# Patient Record
Sex: FEMALE | Race: WHITE | URBAN - NONMETROPOLITAN AREA
[De-identification: names, ages, dates, MRNs, and addresses within clinical notes are randomized per-mention and may not be internally consistent; named-entity substitution may affect disease eponyms.]

---

## 2019-02-13 ENCOUNTER — IMPORTED ENCOUNTER (OUTPATIENT)
Dept: URBAN - NONMETROPOLITAN AREA CLINIC 1 | Facility: CLINIC | Age: 79
End: 2019-02-13

## 2019-02-13 PROBLEM — H25.813: Noted: 2019-02-13

## 2019-02-13 PROBLEM — H40.1131: Noted: 2019-02-13

## 2019-02-13 PROCEDURE — 99214 OFFICE O/P EST MOD 30 MIN: CPT

## 2019-02-13 PROCEDURE — 92133 CPTRZD OPH DX IMG PST SGM ON: CPT

## 2019-02-13 NOTE — PATIENT DISCUSSION
POAG OU - Mild Stage- IOP's OU are adequately controlled under current medical management.- Recommend continue medications without change. - Recommend follow-up in 3 months- Recommend  to determine if patient qualifies for iStent. Cataract OU-Visually significant.-Cataract(s) causing symptomatic impairment of visual function not correctable with a tolerable change in glasses or contact lenses lighting or non-operative means resulting in specific activity limitations and/or participation restrictions including but not limited to reading viewing television driving or meeting vocational or recreational needs. -Expectation is clearer vision and reduced glare disability after cataract removal.-Refer to Dr Angeli Brumfield for cataract evaluation; Dr's Notes: 2/13/19 - OCT 0.67/0.69; IOP's 15/14; CDR 0.65/0.6

## 2022-04-09 ASSESSMENT — TONOMETRY
OD_IOP_MMHG: 15
OS_IOP_MMHG: 14

## 2022-04-09 ASSESSMENT — VISUAL ACUITY
OS_CC: 20/60-
OS_PH: 20/50-
OD_CC: 20/40